# Patient Record
Sex: FEMALE | Race: WHITE
[De-identification: names, ages, dates, MRNs, and addresses within clinical notes are randomized per-mention and may not be internally consistent; named-entity substitution may affect disease eponyms.]

---

## 2023-01-22 ENCOUNTER — HOSPITAL ENCOUNTER (EMERGENCY)
Dept: HOSPITAL 47 - EC | Age: 38
Discharge: HOME | End: 2023-01-22
Payer: COMMERCIAL

## 2023-01-22 VITALS — SYSTOLIC BLOOD PRESSURE: 141 MMHG | RESPIRATION RATE: 18 BRPM | HEART RATE: 82 BPM | DIASTOLIC BLOOD PRESSURE: 90 MMHG

## 2023-01-22 VITALS — TEMPERATURE: 97.8 F

## 2023-01-22 DIAGNOSIS — N92.0: Primary | ICD-10-CM

## 2023-01-22 DIAGNOSIS — Z88.5: ICD-10-CM

## 2023-01-22 DIAGNOSIS — D25.9: ICD-10-CM

## 2023-01-22 DIAGNOSIS — Z88.0: ICD-10-CM

## 2023-01-22 DIAGNOSIS — Z88.8: ICD-10-CM

## 2023-01-22 LAB
ALBUMIN SERPL-MCNC: 3.9 G/DL (ref 3.5–5)
ALP SERPL-CCNC: 76 U/L (ref 38–126)
ALT SERPL-CCNC: 17 U/L (ref 4–34)
ANION GAP SERPL CALC-SCNC: 5 MMOL/L
APTT BLD: 23.7 SEC (ref 22–30)
AST SERPL-CCNC: 17 U/L (ref 14–36)
BASOPHILS # BLD AUTO: 0.1 K/UL (ref 0–0.2)
BASOPHILS NFR BLD AUTO: 1 %
BUN SERPL-SCNC: 10 MG/DL (ref 7–17)
CALCIUM SPEC-MCNC: 8.5 MG/DL (ref 8.4–10.2)
CHLORIDE SERPL-SCNC: 107 MMOL/L (ref 98–107)
CO2 SERPL-SCNC: 26 MMOL/L (ref 22–30)
EOSINOPHIL # BLD AUTO: 0 K/UL (ref 0–0.7)
EOSINOPHIL NFR BLD AUTO: 0 %
ERYTHROCYTE [DISTWIDTH] IN BLOOD BY AUTOMATED COUNT: 3.78 M/UL (ref 3.8–5.4)
ERYTHROCYTE [DISTWIDTH] IN BLOOD: 13.3 % (ref 11.5–15.5)
GLUCOSE SERPL-MCNC: 98 MG/DL (ref 74–99)
HCT VFR BLD AUTO: 33.8 % (ref 34–46)
HGB BLD-MCNC: 11.3 GM/DL (ref 11.4–16)
INR PPP: 1 (ref ?–1.2)
LYMPHOCYTES # SPEC AUTO: 2.6 K/UL (ref 1–4.8)
LYMPHOCYTES NFR SPEC AUTO: 25 %
MCH RBC QN AUTO: 29.8 PG (ref 25–35)
MCHC RBC AUTO-ENTMCNC: 33.3 G/DL (ref 31–37)
MCV RBC AUTO: 89.4 FL (ref 80–100)
MONOCYTES # BLD AUTO: 0.4 K/UL (ref 0–1)
MONOCYTES NFR BLD AUTO: 4 %
NEUTROPHILS # BLD AUTO: 7 K/UL (ref 1.3–7.7)
NEUTROPHILS NFR BLD AUTO: 69 %
PH UR: 6.5 [PH] (ref 5–8)
PLATELET # BLD AUTO: 205 K/UL (ref 150–450)
POTASSIUM SERPL-SCNC: 4 MMOL/L (ref 3.5–5.1)
PROT SERPL-MCNC: 6.1 G/DL (ref 6.3–8.2)
PT BLD: 10.7 SEC (ref 9–12)
RBC UR QL: >182 /HPF (ref 0–5)
SODIUM SERPL-SCNC: 138 MMOL/L (ref 137–145)
SP GR UR: 1.02 (ref 1–1.03)
UROBILINOGEN UR QL STRIP: <2 MG/DL (ref ?–2)
WBC # BLD AUTO: 10.2 K/UL (ref 3.8–10.6)
WBC # UR AUTO: 5 /HPF (ref 0–5)

## 2023-01-22 PROCEDURE — 99284 EMERGENCY DEPT VISIT MOD MDM: CPT

## 2023-01-22 PROCEDURE — 85730 THROMBOPLASTIN TIME PARTIAL: CPT

## 2023-01-22 PROCEDURE — 85025 COMPLETE CBC W/AUTO DIFF WBC: CPT

## 2023-01-22 PROCEDURE — 36415 COLL VENOUS BLD VENIPUNCTURE: CPT

## 2023-01-22 PROCEDURE — 85610 PROTHROMBIN TIME: CPT

## 2023-01-22 PROCEDURE — 80053 COMPREHEN METABOLIC PANEL: CPT

## 2023-01-22 PROCEDURE — 81001 URINALYSIS AUTO W/SCOPE: CPT

## 2023-01-22 PROCEDURE — 76830 TRANSVAGINAL US NON-OB: CPT

## 2023-01-22 PROCEDURE — 93976 VASCULAR STUDY: CPT

## 2023-01-22 PROCEDURE — 81025 URINE PREGNANCY TEST: CPT

## 2023-01-22 NOTE — ED
Female Urogenital HPI





- General


Chief complaint: Vaginal Bleeding


Stated complaint: Vaginal Bleeding


Time Seen by Provider: 01/22/23 15:25


Source: patient


Mode of arrival: ambulatory


Limitations: no limitations





- History of Present Illness


Initial comments: 





Review 7-year-old female with past history of fibroid uterus presents to the 

emergency department reporting vaginal bleeding.  States that she had a normal 

menstrual cycle in December.  It lasted for 8 days.  She was then off of her 

cycle for approximately 4 days before she began bleeding again.  She has blood 

consistently since December 27.  Reports 2 history of abnormal vaginal bleeding 

due to fibroid uterus.  Follows with an OB/GYN out of her home state of [place].

 Recently moved to the area.  She used to take birth control however was taken 

off of this medication as her neurologist was concerned for aneurysm in her 

brain.  She does not currently take any medications.  No concern for pregnancy. 

No vaginal discharge or concern for sexually transmitted infections.  States 

that the bleeding is heavy however cannot state how many pads she goes through 

and a day.  She denies fevers.  No bowel or bladder dysfunction.  No other 

alleviating, precipitating or modifying factors





- Related Data


                                  Previous Rx's











 Medication  Instructions  Recorded


 


medroxyPROGESTERone [Provera] 10 mg PO DAILY #9 tablet 01/22/23


 


oxyCODONE HCL/ACETAMINOPHEN 1 tab PO Q6HR PRN 3 Days #12 tab 01/22/23





[Percocet 5-325 mg]  











                                    Allergies











Allergy/AdvReac Type Severity Reaction Status Date / Time


 


albuterol Allergy  Unknown Verified 01/22/23 15:29


 


Penicillins Allergy  Unknown Verified 01/22/23 15:29


 


codeine AdvReac  Nausea & Verified 01/22/23 15:29





   Vomiting  


 


morphine AdvReac  Nausea & Verified 01/22/23 17:58





   Vomiting  














Review of Systems


ROS Statement: 


Those systems with pertinent positive or pertinent negative responses have been 

documented in the HPI.





ROS Other: All systems not noted in ROS Statement are negative.





Past Medical History


Additional Past Medical History / Comment(s): uterine fibroid. Brain Anuerysm


History of Any Multi-Drug Resistant Organisms: None Reported


Past Surgical History: Appendectomy


Additional Past Surgical History / Comment(s): spleen.  ovarian


Past Psychological History: No Psychological Hx Reported


Smoking Status: Never smoker


Past Alcohol Use History: Occasional


Past Drug Use History: None Reported





General Exam


Limitations: no limitations


General appearance: alert, in no apparent distress


Head exam: Present: atraumatic, normocephalic, normal inspection


Eye exam: Present: normal appearance, PERRL, EOMI.  Absent: scleral icterus, 

conjunctival injection, periorbital swelling


ENT exam: Present: normal exam, mucous membranes moist


Neck exam: Present: normal inspection.  Absent: tenderness, meningismus, 

lymphadenopathy


Respiratory exam: Present: normal lung sounds bilaterally.  Absent: respiratory 

distress, wheezes, rales, rhonchi, stridor


Cardiovascular Exam: Present: normal rhythm, tachycardia, normal heart sounds.  

Absent: systolic murmur, diastolic murmur, rubs, gallop, clicks


GI/Abdominal exam: Present: soft, normal bowel sounds.  Absent: distended, 

tenderness, guarding, rebound, rigid


Speculum exam: Present: vaginal bleeding (heavy clot burden)


Extremities exam: Present: normal inspection, full ROM, normal capillary refill.

 Absent: tenderness, pedal edema, joint swelling, calf tenderness


Back exam: Present: normal inspection


Neurological exam: Present: alert, oriented X3, CN II-XII intact


Psychiatric exam: Present: normal affect, normal mood


Skin exam: Present: warm, dry, intact, normal color.  Absent: rash





Course


                                   Vital Signs











  01/22/23 01/22/23





  15:25 19:01


 


Temperature 97.8 F 


 


Pulse Rate 110 H 82


 


Respiratory 16 18





Rate  


 


Blood Pressure 140/87 141/90


 


O2 Sat by Pulse 98 95





Oximetry  














Medical Decision Making





- Medical Decision Making


Was pt. sent in by a medical professional or institution?


no


Did you speak to anyone other than the patient for history?  


no


Did you review nursing and triage notes? 


yes and i agree


Were old charts reviewed? 


no


Differential Diagnosis? 


ectopic pregnancy, abnormal vaginal bleeding, pcos, cancer, vaginal laceration, 

miscarriage, fibroids


EKG interpreted by me (3pts min.)?


no


X-rays interpreted by me (1pt min.)?


no


CT interpreted by me (1pt min.)?


yes


U/S interpreted by me (1pt. min.)?


yes


What testing was considered but not performed? (CT, X-rays, U/S, labs)? Why?


no


What meds were considered but not given? Why?


none


Did you discuss the management of the patient with other professionals?


yes, obgyn on call


Did you reconcile home meds?


no


Was smoking cessation discussed for >3mins.?


no


Was critical care preformed (if so, how long)?


no


Were there social determinants of health that impacted care today? How? 

(Homelessness, low income, unemployed, alcoholism, drug addiction, 

transportation, low edu. Level, literacy, decrease access to med. care, long-term, 

rehab)?


no


Was there de-escalation of care discussed even if they declined? (Discuss DNR or

withdrawal of care, Hospice)?


no


What co-morbidities impacted this encounter? (DM, HTN, Smoking, COPD, CAD, 

Cancer, CVA, Hep., AIDS, mental health diagnosis, sleep apnea, morbid obesity)?


none


Was patient admitted / discharged?


Upon arrival patient was placed into room 5.  There are history and physical 

exam was performed.  Patient does have copious thick clots on physical exam.  

She is hemodynamically stable.  IV is established laboratory studies are 

conducted.  Ultrasound is performed.  Laboratory studies demonstrate a 

hemoglobin of 11.3.  Ultrasound demonstrates a fibroid uterus.  I did call and 

speak with Dr. Diaz in regards to the patient's symptoms.  She does recommends 

10 days of Provera.  Patient will then follow up with her OB/GYN for further 

management and treatment options.  Patient does request Percocet for pain 

medication by name and states she can not take any other medication due to 

allergy.  Patient will be given a short prescription for pain medications.  

Instructed to return for any new or worsening symptoms.  Patient agreeable to 

the treatment plan she was discharged home in stable condition


Undiagnosed new problem with uncertain prognosis?


yes


Drug Therapy requiring intensive monitoring for toxicity (Heparin, Nitro, 

Insulin, Cardizem)?


no


Were any procedures done?


no


Diagnosis/symptom?


abnormal vaginal bleeding


Acute, or Chronic, or Acute on Chronic?


acute on chronic


Uncomplicated (without systemic symptoms) or Complicated (systemic symptoms)?


complicated


Side effects of treatment?


weight gain, worsening ab vag bleeding


Exacerbation, Progression, or Severe Exacerbation]


severe exacerbation


Poses a threat to life or bodily function?


yes 








- Lab Data


Result diagrams: 


                                 01/22/23 16:31





                                 01/22/23 16:31


                                   Lab Results











  01/22/23 01/22/23 01/22/23 Range/Units





  16:31 16:31 16:31 


 


WBC  10.2    (3.8-10.6)  k/uL


 


RBC  3.78 L    (3.80-5.40)  m/uL


 


Hgb  11.3 L    (11.4-16.0)  gm/dL


 


Hct  33.8 L    (34.0-46.0)  %


 


MCV  89.4    (80.0-100.0)  fL


 


MCH  29.8    (25.0-35.0)  pg


 


MCHC  33.3    (31.0-37.0)  g/dL


 


RDW  13.3    (11.5-15.5)  %


 


Plt Count  205    (150-450)  k/uL


 


MPV  7.7    


 


Neutrophils %  69    %


 


Lymphocytes %  25    %


 


Monocytes %  4    %


 


Eosinophils %  0    %


 


Basophils %  1    %


 


Neutrophils #  7.0    (1.3-7.7)  k/uL


 


Lymphocytes #  2.6    (1.0-4.8)  k/uL


 


Monocytes #  0.4    (0-1.0)  k/uL


 


Eosinophils #  0.0    (0-0.7)  k/uL


 


Basophils #  0.1    (0-0.2)  k/uL


 


PT   10.7   (9.0-12.0)  sec


 


INR   1.0   (<1.2)  


 


APTT   23.7   (22.0-30.0)  sec


 


Sodium     (137-145)  mmol/L


 


Potassium     (3.5-5.1)  mmol/L


 


Chloride     ()  mmol/L


 


Carbon Dioxide     (22-30)  mmol/L


 


Anion Gap     mmol/L


 


BUN     (7-17)  mg/dL


 


Creatinine     (0.52-1.04)  mg/dL


 


Est GFR (CKD-EPI)AfAm     (>60 ml/min/1.73 sqM)  


 


Est GFR (CKD-EPI)NonAf     (>60 ml/min/1.73 sqM)  


 


Glucose     (74-99)  mg/dL


 


Calcium     (8.4-10.2)  mg/dL


 


Total Bilirubin     (0.2-1.3)  mg/dL


 


AST     (14-36)  U/L


 


ALT     (4-34)  U/L


 


Alkaline Phosphatase     ()  U/L


 


Total Protein     (6.3-8.2)  g/dL


 


Albumin     (3.5-5.0)  g/dL


 


Urine Color    Yellow  


 


Urine Appearance    Clear  (Clear)  


 


Urine pH    6.5  (5.0-8.0)  


 


Ur Specific Gravity    1.016  (1.001-1.035)  


 


Urine Protein    Trace H  (Negative)  


 


Urine Glucose (UA)    Negative  (Negative)  


 


Urine Ketones    Negative  (Negative)  


 


Urine Blood    Large H  (Negative)  


 


Urine Nitrite    Negative  (Negative)  


 


Urine Bilirubin    Negative  (Negative)  


 


Urine Urobilinogen    <2.0  (<2.0)  mg/dL


 


Ur Leukocyte Esterase    Negative  (Negative)  


 


Urine RBC    >182 H  (0-5)  /hpf


 


Urine WBC    5  (0-5)  /hpf


 


Urine Bacteria    Rare H  (None)  /hpf


 


Urine Mucus    Many H  (None)  /hpf


 


Urine HCG, Qual     (Not Detectd)  














  01/22/23 01/22/23 Range/Units





  16:31 16:31 


 


WBC    (3.8-10.6)  k/uL


 


RBC    (3.80-5.40)  m/uL


 


Hgb    (11.4-16.0)  gm/dL


 


Hct    (34.0-46.0)  %


 


MCV    (80.0-100.0)  fL


 


MCH    (25.0-35.0)  pg


 


MCHC    (31.0-37.0)  g/dL


 


RDW    (11.5-15.5)  %


 


Plt Count    (150-450)  k/uL


 


MPV    


 


Neutrophils %    %


 


Lymphocytes %    %


 


Monocytes %    %


 


Eosinophils %    %


 


Basophils %    %


 


Neutrophils #    (1.3-7.7)  k/uL


 


Lymphocytes #    (1.0-4.8)  k/uL


 


Monocytes #    (0-1.0)  k/uL


 


Eosinophils #    (0-0.7)  k/uL


 


Basophils #    (0-0.2)  k/uL


 


PT    (9.0-12.0)  sec


 


INR    (<1.2)  


 


APTT    (22.0-30.0)  sec


 


Sodium   138  (137-145)  mmol/L


 


Potassium   4.0  (3.5-5.1)  mmol/L


 


Chloride   107  ()  mmol/L


 


Carbon Dioxide   26  (22-30)  mmol/L


 


Anion Gap   5  mmol/L


 


BUN   10  (7-17)  mg/dL


 


Creatinine   0.78  (0.52-1.04)  mg/dL


 


Est GFR (CKD-EPI)AfAm   >90  (>60 ml/min/1.73 sqM)  


 


Est GFR (CKD-EPI)NonAf   >90  (>60 ml/min/1.73 sqM)  


 


Glucose   98  (74-99)  mg/dL


 


Calcium   8.5  (8.4-10.2)  mg/dL


 


Total Bilirubin   0.3  (0.2-1.3)  mg/dL


 


AST   17  (14-36)  U/L


 


ALT   17  (4-34)  U/L


 


Alkaline Phosphatase   76  ()  U/L


 


Total Protein   6.1 L  (6.3-8.2)  g/dL


 


Albumin   3.9  (3.5-5.0)  g/dL


 


Urine Color    


 


Urine Appearance    (Clear)  


 


Urine pH    (5.0-8.0)  


 


Ur Specific Gravity    (1.001-1.035)  


 


Urine Protein    (Negative)  


 


Urine Glucose (UA)    (Negative)  


 


Urine Ketones    (Negative)  


 


Urine Blood    (Negative)  


 


Urine Nitrite    (Negative)  


 


Urine Bilirubin    (Negative)  


 


Urine Urobilinogen    (<2.0)  mg/dL


 


Ur Leukocyte Esterase    (Negative)  


 


Urine RBC    (0-5)  /hpf


 


Urine WBC    (0-5)  /hpf


 


Urine Bacteria    (None)  /hpf


 


Urine Mucus    (None)  /hpf


 


Urine HCG, Qual  Not Detected   (Not Detectd)  














Disposition


Clinical Impression: 


 Menorrhagia, Fibroid uterus





Disposition: HOME SELF-CARE


Condition: Stable


Instructions (If sedation given, give patient instructions):  Menorrhagia (ED)


Additional Instructions: 


Please take the medications as directed.  You will have a menstrual cycle when 

these medications finished.  Please call and make an appointment with your 

OB/GYN or one of the local OB/GYN's.  Return to the emergency room for any new 

or worsening symptoms


Prescriptions: 


oxyCODONE HCL/ACETAMINOPHEN [Percocet 5-325 mg] 1 tab PO Q6HR PRN 3 Days #12 tab


 PRN Reason: Pain


medroxyPROGESTERone [Provera] 10 mg PO DAILY #9 tablet


Is patient prescribed a controlled substance at d/c from ED?: Yes


When asked, does pt state using other controlled substances?: No


If prescribed controlled substance>3 days was MAPS reviewed?: Prescribed <3 Days


Referrals: 


Liam Mitchell DO [Primary Care Provider] - 1-2 days


Luna Elkins DO [Doctor of Osteopathic Medicine] - 1-2 days


Emmett Bautista DO [REFERRING] - 1-2 days


Rai Nuñez DO [REFERRING] - 1-2 days


Time of Disposition: 19:16 Statement Selected

## 2023-01-22 NOTE — US
EXAMINATION TYPE: US transvaginal

 

DATE OF EXAM: 2023

 

COMPARISON: NONE

 

CLINICAL HISTORY: heavy vaginal bleeding. heavy bleeding in between cycle, ongoing issues, h/o fibroi
ds, partial right oophorectomy, 

 

TECHNIQUE:  TV.  Transvaginal sonographic images 

 

Date of LMP:  around 2022

 

EXAM MEASUREMENTS:

 

Uterus:  11.1 x 5.4 x 5.9 cm

Endometrial Stripe: 0.9 cm

Right Ovary:  not seen 

Left Ovary:  4.4 x 3.2 x 3.1 cm

 

 

 

1. Uterus:  Retroverted multiple fibroids seen, largest ,midline which obscures view of endometrium =
 3.0 x 2.4 x 2..5cm 

2. Endometrium:  fundal portion seen appears wnl

3. Right Ovary:  not seen due to bowel gas and partial removal of ovarian tissue

4. Left Ovary:  simple cyst = 1.8 x 2.3 x 2.1cm

**Spectral, color and waveform doppler imaging shows good arterial and venous flow within the left ov
garo; there is no evidence for left ovarian torsion.

5. Bilateral Adnexa:  wnl

6. Posterior cul-de-sac:  wnl

 

 

 

IMPRESSION:

Right ovary not seen. No suspicious adnexal mass. Simple left ovarian cyst. Enlarged fibroid uterus. 
No evidence of ovarian torsion.

## 2023-06-26 ENCOUNTER — HOSPITAL ENCOUNTER (OUTPATIENT)
Dept: HOSPITAL 47 - RADUSWWP | Age: 38
Discharge: HOME | End: 2023-06-26
Attending: FAMILY MEDICINE
Payer: COMMERCIAL

## 2023-06-26 DIAGNOSIS — D25.9: ICD-10-CM

## 2023-06-26 DIAGNOSIS — N83.291: Primary | ICD-10-CM

## 2023-06-26 DIAGNOSIS — Z86.018: ICD-10-CM

## 2023-06-26 PROCEDURE — 76856 US EXAM PELVIC COMPLETE: CPT

## 2023-06-26 NOTE — US
EXAMINATION TYPE: US pelvic complete

 

DATE OF EXAM: 6/26/2023

 

COMPARISON: Transvaginal ultrasound 1/22/2023

 

CLINICAL INDICATION: Female, 38 years old with history of D25.9 LEIOMYOMA OF UTERUS; BLEEDING IN BETW
EEN CYCLES, IUD PLACEMENT FROM MAY INSERTION

 

TECHNIQUE:  Transabdominal (TA).  Transabdominal sonographic images of the pelvis were acquired. 

 

Date of LMP:  NO DISCERNABLE PERIODS, CONSISTENT BLEEDING 

 

EXAM MEASUREMENTS:

 

Uterus:  10.3X4.9X6.3 cm

Endometrial Stripe: 0.9 cm

Right Ovary:  4.9X3.3X4.5 cm

Left Ovary:  4.5X2.9X3.8 cm

 

 

 

1. Uterus:  Anteverted   HETEROGENOUS WITH SEVERAL FIBROTIC AREAS SEEN. LARGEST MEASURES: 3.1X3.3X3.5
CM

2. Endometrium:  WNL, IUD SEEN WITHIN FUNDUS

3. Right Ovary:  CYSTIC AREA MEASURES: 3.7X2.8X2.3CM

4. Left Ovary:  wnl

5. Bilateral Adnexa:  wnl

6. Posterior cul-de-sac:  wnl

 

Heterogenous appearance of the uterus with several fibroids identified with largest measuring up to 3
.5 cm. Endometrium is within normal limits with IUD present. Heterogenous right ovarian cyst measurin
g up to 4.5 cm with complex internal echoes. Left ovary is unremarkable. No free fluid.

 

IMPRESSION: 

1. Right ovarian complex cystic lesion favored to represent a hemorrhagic cyst. Consider follow-up ul
trasound in 6-12 weeks to assess for resolution.

2. Fibroid changes of the uterus.

## 2023-09-13 ENCOUNTER — HOSPITAL ENCOUNTER (OUTPATIENT)
Dept: HOSPITAL 47 - ORWHC2ENDO | Age: 38
Discharge: HOME | End: 2023-09-13
Attending: INTERNAL MEDICINE
Payer: COMMERCIAL

## 2023-09-13 VITALS — DIASTOLIC BLOOD PRESSURE: 76 MMHG | HEART RATE: 72 BPM | SYSTOLIC BLOOD PRESSURE: 128 MMHG

## 2023-09-13 VITALS — BODY MASS INDEX: 23.6 KG/M2

## 2023-09-13 VITALS — RESPIRATION RATE: 16 BRPM | TEMPERATURE: 98.6 F

## 2023-09-13 DIAGNOSIS — Z79.899: ICD-10-CM

## 2023-09-13 DIAGNOSIS — Z86.010: ICD-10-CM

## 2023-09-13 DIAGNOSIS — I67.9: ICD-10-CM

## 2023-09-13 DIAGNOSIS — F15.20: ICD-10-CM

## 2023-09-13 DIAGNOSIS — K29.50: Primary | ICD-10-CM

## 2023-09-13 DIAGNOSIS — Z88.9: ICD-10-CM

## 2023-09-13 DIAGNOSIS — Z88.0: ICD-10-CM

## 2023-09-13 DIAGNOSIS — Z62.891: ICD-10-CM

## 2023-09-13 DIAGNOSIS — Z88.5: ICD-10-CM

## 2023-09-13 DIAGNOSIS — F90.9: ICD-10-CM

## 2023-09-13 DIAGNOSIS — I67.1: ICD-10-CM

## 2023-09-13 LAB — GLUCOSE BLD-MCNC: 100 MG/DL (ref 70–110)

## 2023-09-13 PROCEDURE — 81025 URINE PREGNANCY TEST: CPT

## 2023-09-13 PROCEDURE — 88305 TISSUE EXAM BY PATHOLOGIST: CPT

## 2023-09-13 PROCEDURE — 43239 EGD BIOPSY SINGLE/MULTIPLE: CPT

## 2023-09-13 PROCEDURE — 45378 DIAGNOSTIC COLONOSCOPY: CPT

## 2023-09-13 RX ADMIN — POTASSIUM CHLORIDE SCH MLS: 14.9 INJECTION, SOLUTION INTRAVENOUS at 07:18

## 2023-09-13 RX ADMIN — POTASSIUM CHLORIDE SCH MLS: 14.9 INJECTION, SOLUTION INTRAVENOUS at 07:24

## 2023-09-13 NOTE — P.PCN
Date of Procedure: 09/13/23


Procedure(s) Performed: 


Brief history:


Patient is a pleasan 38-year-old white female] scheduled for an elective upper 

endoscopy as well as colonoscopy as a part of evaluation  of iron deficiency 

anemia and prior history of colon polyps.  Her last colonoscopy was 5 years ago.





Procedure performed:


Esophagogastroduodenoscopywith biopsy


Colonoscopy





Preoperative diagnosis:


iron deficiency anemia


History of colon polyp





Anesthesia: MAC





Procedure:


After informed consent was obtained from the patient  was brought into the 

endoscopy unit and IV  sedation was administered by anesthesia under continuous 

monitoring.  Initially upper endoscopy was done.  The Olympus  video 

endoscope was inserted inserted into the mouth and esophagus intubated without 

any difficulty and was gradually advanced into the stomach and duodenum and 

carefully examined.  The bulb and second part of the duodenum appeared 

normal.and biopsies were done from the duodenum to rule out celiac disease. The 

scope was then withdrawn into the stomach adequately insufflated with air and 

upon careful examination  the antrum and mild gastritis and biopsies were done 

from this area. body, cardia and fundus appeared normal.  The scope was then 

withdrawn into the esophagus.  The GE junction was located at 40 cm to the 

incisors.  It appeared regular with no erythema erosions or ulcerations.  Rest 

of the esophagus appeared normal.  Patient tolerated the procedure well.





At this time the patient continued to remain sedation.  Initial digital rectal 

examination was normal.  Olympus  video colonoscope was then inserted into

the rectum and gradually advanced to the cecum without any difficulty.  Careful 

examination was performed as the scope was gradually being withdrawn.  The prep 

was excellent.  The cecum, ascending colon, transverse colon, descending colon, 

sigmoid colon and rectum appeared normal.  Retroflexion was performed in the 

rectum and no lesions were noted.  Patient tolerated the procedure well.





Impression:


1. Upper endoscopy revealed mild antral gastritis but no evidence of esophagitis

or peptic ulcer disease]


2. Colonoscopy was within normal limits with no evidence of colitis or 

colorectal neoplasia








Recommendations:


Findings of this examination were discussed with the patient as well as her 

family.  She was advised to follow with the biopsy results.  Recommend repeat 

colonoscopy in 10 years.

## 2024-08-12 ENCOUNTER — HOSPITAL ENCOUNTER (OUTPATIENT)
Dept: HOSPITAL 47 - LABPRL | Age: 39
Discharge: HOME | End: 2024-08-12
Payer: SELF-PAY

## 2024-08-12 PROCEDURE — 86765 RUBEOLA ANTIBODY: CPT

## 2024-08-12 PROCEDURE — 86787 VARICELLA-ZOSTER ANTIBODY: CPT

## 2024-08-12 PROCEDURE — 86735 MUMPS ANTIBODY: CPT

## 2024-08-12 PROCEDURE — 86762 RUBELLA ANTIBODY: CPT

## 2024-08-12 PROCEDURE — 87340 HEPATITIS B SURFACE AG IA: CPT

## 2025-07-12 ENCOUNTER — HOSPITAL ENCOUNTER (EMERGENCY)
Dept: HOSPITAL 47 - EC | Age: 40
LOS: 1 days | Discharge: HOME | End: 2025-07-13
Payer: MEDICAID

## 2025-07-12 VITALS — TEMPERATURE: 98.5 F

## 2025-07-12 DIAGNOSIS — N83.292: Primary | ICD-10-CM

## 2025-07-12 DIAGNOSIS — Z87.891: ICD-10-CM

## 2025-07-12 DIAGNOSIS — Z88.0: ICD-10-CM

## 2025-07-12 DIAGNOSIS — Z88.8: ICD-10-CM

## 2025-07-12 DIAGNOSIS — Z88.5: ICD-10-CM

## 2025-07-12 LAB
ALBUMIN SERPL-MCNC: 4.9 G/DL (ref 3.5–5)
ALP SERPL-CCNC: 104 U/L (ref 38–126)
ALT SERPL-CCNC: 17 U/L (ref 4–34)
AMORPH SED URNS QL MICRO: (no result) /HPF
ANION GAP SERPL CALC-SCNC: 13 MMOL/L
ANISOCYTOSIS BLD QL SMEAR: (no result)
APPEARANCE UR: (no result)
APTT BLD: 23.8 SEC (ref 22–30)
AST SERPL-CCNC: 22 U/L (ref 14–36)
BACTERIA UR QL AUTO: (no result) /HPF
BASO STIPL BLD QL SMEAR: (no result)
BASOPHILS # BLD AUTO: 0.05 10*3/UL (ref 0–0.1)
BASOPHILS NFR BLD AUTO: 0.4 %
BILIRUB BLD-MCNC: 0.6 MG/DL (ref 0.2–1.3)
BILIRUB UR QL CFM: (no result)
BILIRUB UR QL STRIP.AUTO: NEGATIVE
BROAD CASTS [PRESENCE] IN URINE BY COMPUTER ASSISTED METHOD: (no result) /LPF
BUN SERPL-SCNC: 12 MG/DL (ref 7–17)
BURR CELLS BLD QL SMEAR: (no result)
CA CARBONATE CRY #/AREA URNS HPF: (no result) /HPF
CA PHOS CRY UR QL COMP ASSIST: (no result) /HPF
CALCIUM SPEC-MCNC: 9.7 MG/DL (ref 8.4–10.2)
CAOX CRY UR QL COMP ASSIST: (no result) /HPF
CASTS URNS QL MICRO: (no result) /LPF
CHLORIDE SERPL-SCNC: 103 MMOL/L (ref 98–107)
CO2 SERPL-SCNC: 23 MMOL/L (ref 22–30)
COLOR UR: (no result)
CREATININE: 0.83 MG/DL (ref 0.52–1.04)
CRYSTALS UR QL: (no result) /HPF
CYSTINE CRY #/AREA URNS HPF: (no result) /HPF
DACRYOCYTES BLD QL SMEAR: (no result)
DOHLE BOD BLD QL SMEAR: (no result)
EOSINOPHIL # BLD AUTO: 0.03 10*3/UL (ref 0.04–0.35)
EOSINOPHIL NFR BLD AUTO: 0.2 %
EPITHELIAL CASTS [PRESENCE] IN URINE BY COMPUTER ASSISTED METHOD: (no result) /LPF
ERYTHROCYTE CLUMPS [PRESENCE] IN URINE BY COMPUTER ASSISTED METHOD: (no result) /HPF
ERYTHROCYTE [DISTWIDTH] IN BLOOD BY AUTOMATED COUNT: 4.32 10*6/UL (ref 4.1–5.2)
ERYTHROCYTE [DISTWIDTH] IN BLOOD: 12.7 % (ref 11.5–14.5)
FATTY CASTS #/AREA UR COMP ASSIST: (no result) /LPF
GLUCOSE SERPL-MCNC: 104 MG/DL (ref 74–99)
GLUCOSE UR QL: NEGATIVE
GRAN CASTS UR QL COMP ASSIST: (no result) /LPF
HCT VFR BLD AUTO: 40.1 % (ref 37.2–46.3)
HGB BLD-MCNC: 13.6 G/DL (ref 12–15)
HOWELL-JOLLY BOD BLD QL SMEAR: (no result)
HYALINE CASTS UR QL AUTO: 1 /LPF (ref 0–2)
HYPOCHROMIA BLD QL SMEAR: (no result)
IMM GRANULOCYTES # BLD: 0.03 10*3/UL (ref 0–0.04)
INR PPP: 0.9 (ref ?–1.2)
KETONES UR QL STRIP.AUTO: NEGATIVE
LEUCINE CRYSTALS [PRESENCE] IN URINE BY COMPUTER ASSISTED METHOD: (no result) /HPF
LEUKOCYTE ESTERASE UR QL STRIP.AUTO: NEGATIVE
LG PLATELETS BLD QL SMEAR: (no result)
LIPASE SERPL-CCNC: 85 U/L (ref 23–300)
LYMPHOCYTES # SPEC AUTO: 2.78 10*3/UL (ref 0.9–5)
LYMPHOCYTES NFR SPEC AUTO: 23.1 %
Lab: (no result)
MCH RBC QN AUTO: 31.5 PG (ref 27–32)
MCHC RBC AUTO-ENTMCNC: 33.9 G/DL (ref 32–37)
MCV RBC AUTO: 92.8 FL (ref 80–97)
MIXED CELL CASTS UR QL COMP ASSIST: (no result) /LPF
MONOCYTES # BLD AUTO: 0.82 10*3/UL (ref 0.2–1)
MONOCYTES NFR BLD AUTO: 6.8 %
MUCOUS THREADS UR QL AUTO: (no result) /HPF
NEUTROPHILS # BLD AUTO: 8.33 10*3/UL (ref 1.8–7.7)
NEUTROPHILS NFR BLD AUTO: 69.3 %
NEUTS HYPERSEG # BLD: (no result) 10*3/UL
NITRITE UR QL STRIP.AUTO: NEGATIVE
NRBC BLD AUTO-RTO: (no result) %
OVAL FAT BODIES #/AREA UR COMP ASSIST: (no result) /HPF
OVALOCYTES BLD QL SMEAR: (no result)
PELGER HUET CELLS BLD QL SMEAR: (no result)
PH UR: 6 [PH] (ref 5–8)
PLATELET # BLD AUTO: 198 10*3/UL (ref 140–440)
PLATELETS.RETICULATED NFR BLD AUTO: (no result) %
PMV BLD AUTO: 9.8 FL (ref 9.5–12.2)
POIKILOCYTOSIS BLD QL SMEAR: (no result)
POIKILOCYTOSIS BLD QL SMEAR: (no result)
POLYCHROMASIA BLD QL SMEAR: (no result)
POTASSIUM SERPL-SCNC: 3.9 MMOL/L (ref 3.5–5.1)
PROT SERPL-MCNC: 7.4 G/DL (ref 6.3–8.2)
PROT UR QL SSA: (no result)
PROT UR QL: NEGATIVE
PT BLD: 10.1 SEC (ref 10–12.5)
RBC CASTS UR QL COMP ASSIST: (no result) /LPF
RBC MORPH BLD: (no result)
RBC UR QL: (no result)
RBC UR QL: 6 /HPF (ref 0–5)
RENAL EPI CELLS UR QL COMP ASSIST: (no result) /HPF
ROULEAUX BLD QL SMEAR: (no result)
SCHISTOCYTES BLD QL SMEAR: (no result)
SICKLE CELLS BLD QL SMEAR: (no result)
SODIUM SERPL-SCNC: 139 MMOL/L (ref 137–145)
SP GR UR: 1.02 (ref 1–1.03)
SPERM # UR AUTO: (no result) /HPF
SPHEROCYTES BLD QL SMEAR: (no result)
SQUAMOUS UR QL AUTO: 10 /HPF (ref 0–4)
STOMATOCYTES BLD QL SMEAR: (no result)
TARGETS BLD QL SMEAR: (no result)
TOXIC GRANULES BLD QL SMEAR: (no result)
TRANS CELLS UR QL COMP ASSIST: (no result) /HPF (ref 0–1)
TRI-PHOS CRY UR QL COMP ASSIST: (no result) /HPF
TRICHOMONAS UR QL COMP ASSIST: (no result) /HPF
TYROSINE CRYSTALS [PRESENCE] IN URINE BY COMPUTER ASSISTED METHOD: (no result) /HPF
URATE CRY UR QL COMP ASSIST: (no result) /HPF
UROBILINOGEN UR QL STRIP: <2 MG/DL (ref ?–2)
VARIANT LYMPHS BLD QL SMEAR: (no result)
WAXY CASTS #/AREA UR COMP ASSIST: (no result) /LPF
WBC # BLD AUTO: 12.04 10*3/UL (ref 4.5–10)
WBC # UR AUTO: 10 /HPF (ref 0–5)
WBC CASTS #/AREA URNS LPF: (no result) /LPF
WBC CLUMPS UR QL AUTO: (no result) /HPF
WBC NRBC COR # BLD: (no result) K/UL
WBC TOXIC VACUOLES BLD QL SMEAR: (no result)
YEAST BUDDING UR QL COMP ASSIST: (no result) /HPF
YEAST HYPHAE UR QL COMP ASSIST: (no result) /HPF

## 2025-07-12 PROCEDURE — 93976 VASCULAR STUDY: CPT

## 2025-07-12 PROCEDURE — 81001 URINALYSIS AUTO W/SCOPE: CPT

## 2025-07-12 PROCEDURE — 83690 ASSAY OF LIPASE: CPT

## 2025-07-12 PROCEDURE — 96361 HYDRATE IV INFUSION ADD-ON: CPT

## 2025-07-12 PROCEDURE — 74177 CT ABD & PELVIS W/CONTRAST: CPT

## 2025-07-12 PROCEDURE — 99285 EMERGENCY DEPT VISIT HI MDM: CPT

## 2025-07-12 PROCEDURE — 85025 COMPLETE CBC W/AUTO DIFF WBC: CPT

## 2025-07-12 PROCEDURE — 36415 COLL VENOUS BLD VENIPUNCTURE: CPT

## 2025-07-12 PROCEDURE — 76856 US EXAM PELVIC COMPLETE: CPT

## 2025-07-12 PROCEDURE — 85610 PROTHROMBIN TIME: CPT

## 2025-07-12 PROCEDURE — 96374 THER/PROPH/DIAG INJ IV PUSH: CPT

## 2025-07-12 PROCEDURE — 85730 THROMBOPLASTIN TIME PARTIAL: CPT

## 2025-07-12 PROCEDURE — 80053 COMPREHEN METABOLIC PANEL: CPT

## 2025-07-12 PROCEDURE — 96376 TX/PRO/DX INJ SAME DRUG ADON: CPT

## 2025-07-12 PROCEDURE — 96375 TX/PRO/DX INJ NEW DRUG ADDON: CPT

## 2025-07-12 RX ADMIN — PANTOPRAZOLE SODIUM STA MG: 40 INJECTION, POWDER, FOR SOLUTION INTRAVENOUS at 23:18

## 2025-07-12 RX ADMIN — FAMOTIDINE STA MG: 10 INJECTION, SOLUTION INTRAVENOUS at 23:21

## 2025-07-12 RX ADMIN — ACETAMINOPHEN STA ML: 160 SOLUTION ORAL at 23:23

## 2025-07-12 RX ADMIN — CEFAZOLIN ONE MLS/HR: 330 INJECTION, POWDER, FOR SOLUTION INTRAMUSCULAR; INTRAVENOUS at 23:10

## 2025-07-12 RX ADMIN — SUCRALFATE STA GM: 1 TABLET ORAL at 23:40

## 2025-07-12 RX ADMIN — HYDROMORPHONE HYDROCHLORIDE STA MG: 1 INJECTION, SOLUTION INTRAMUSCULAR; INTRAVENOUS; SUBCUTANEOUS at 23:15

## 2025-07-12 RX ADMIN — KETOROLAC TROMETHAMINE STA MG: 15 INJECTION, SOLUTION INTRAMUSCULAR; INTRAVENOUS at 23:12

## 2025-07-12 RX ADMIN — ONDANSETRON STA MG: 2 INJECTION INTRAMUSCULAR; INTRAVENOUS at 23:17

## 2025-07-13 VITALS — RESPIRATION RATE: 16 BRPM | SYSTOLIC BLOOD PRESSURE: 142 MMHG | HEART RATE: 72 BPM | DIASTOLIC BLOOD PRESSURE: 93 MMHG

## 2025-07-13 RX ADMIN — ONDANSETRON STA EACH: 4 TABLET, ORALLY DISINTEGRATING ORAL at 03:57

## 2025-07-13 RX ADMIN — TRAMADOL HYDROCHLORIDE STA EACH: 50 TABLET ORAL at 03:57

## 2025-07-13 RX ADMIN — KETOROLAC TROMETHAMINE STA MG: 15 INJECTION, SOLUTION INTRAMUSCULAR; INTRAVENOUS at 03:58

## 2025-07-13 RX ADMIN — CEPHALEXIN STA MG: 500 CAPSULE ORAL at 00:29

## 2025-07-13 RX ADMIN — HYDROMORPHONE HYDROCHLORIDE STA MG: 1 INJECTION, SOLUTION INTRAMUSCULAR; INTRAVENOUS; SUBCUTANEOUS at 00:18
